# Patient Record
Sex: FEMALE | Race: OTHER | URBAN - METROPOLITAN AREA
[De-identification: names, ages, dates, MRNs, and addresses within clinical notes are randomized per-mention and may not be internally consistent; named-entity substitution may affect disease eponyms.]

---

## 2018-12-12 ENCOUNTER — HOSPITAL ENCOUNTER (OUTPATIENT)
Dept: LAB | Age: 23
Discharge: HOME | End: 2018-12-12
Payer: COMMERCIAL

## 2018-12-12 ENCOUNTER — OFFICE VISIT (OUTPATIENT)
Dept: FAMILY MEDICINE CLINIC | Age: 23
End: 2018-12-12

## 2018-12-12 VITALS
BODY MASS INDEX: 21.18 KG/M2 | HEART RATE: 87 BPM | WEIGHT: 112.2 LBS | OXYGEN SATURATION: 98 % | RESPIRATION RATE: 16 BRPM | TEMPERATURE: 98.4 F | DIASTOLIC BLOOD PRESSURE: 74 MMHG | HEIGHT: 61 IN | SYSTOLIC BLOOD PRESSURE: 112 MMHG

## 2018-12-12 DIAGNOSIS — Z12.4 ENCOUNTER FOR SCREENING FOR CERVICAL CANCER: ICD-10-CM

## 2018-12-12 DIAGNOSIS — Z11.3 SCREENING FOR STD (SEXUALLY TRANSMITTED DISEASE): ICD-10-CM

## 2018-12-12 DIAGNOSIS — Z01.419 WELL WOMAN EXAM WITH ROUTINE GYNECOLOGICAL EXAM: Primary | ICD-10-CM

## 2018-12-12 PROCEDURE — 87798 DETECT AGENT NOS DNA AMP: CPT

## 2018-12-12 PROCEDURE — 88142 CYTOPATH C/V THIN LAYER: CPT

## 2018-12-12 NOTE — PROGRESS NOTES
Subjective:   26 y.o. female for Well Woman Check. Patient's last menstrual period was 11/12/2018 (exact date). Social History: multiple partners, contraception - none Uses Condomes. She has had two partners  Pertinent past medical hstory: no history of HTN, DVT, CAD, DM, liver disease, migraines or smoking. There are no active problems to display for this patient. Not on File  History reviewed. No pertinent past medical history. Past Surgical History:   Procedure Laterality Date    HX HEENT      Deviated septum repair in 2016. History reviewed. No pertinent family history. Social History     Tobacco Use    Smoking status: Never Smoker    Smokeless tobacco: Never Used   Substance Use Topics    Alcohol use: No     Alcohol/week: 0.0 oz     Frequency: Never        ROS:  Feeling well. No dyspnea or chest pain on exertion. No abdominal pain, change in bowel habits, black or bloody stools. No urinary tract symptoms. GYN ROS: normal menses, no abnormal bleeding, pelvic pain or discharge, no breast pain or new or enlarging lumps on self exam. No neurological complaints. Objective:     Visit Vitals  /74 (BP 1 Location: Left arm, BP Patient Position: Sitting)   Pulse 87   Temp 98.4 °F (36.9 °C) (Oral)   Resp 16   Ht 5' 0.63\" (1.54 m)   Wt 112 lb 3.2 oz (50.9 kg)   LMP 11/12/2018 (Exact Date)   SpO2 98%   BMI 21.46 kg/m²     The patient appears well, alert, oriented x 3, in no distress. ENT normal.  Neck supple. No adenopathy or thyromegaly. HALINA. Lungs are clear, good air entry, no wheezes, rhonchi or rales. S1 and S2 normal, no murmurs, regular rate and rhythm. Abdomen soft without tenderness, guarding, mass or organomegaly. Extremities show no edema, normal peripheral pulses. Neurological is normal, no focal findings.     BREAST EXAM: breasts appear normal, no suspicious masses, no skin or nipple changes or axillary nodes    PELVIC EXAM: normal external genitalia, vulva, vagina, cervix, uterus and adnexa    Assessment/Plan:   well woman  pap smear  return annually or prn    ICD-10-CM ICD-9-CM    1. Well woman exam with routine gynecological exam Z01.419 V72.31     [V72.31]   2. Encounter for screening for cervical cancer  Z12.4 V76.2 PAP, LB, RFX HPV [facility]([id number]   3. Screening for STD (sexually transmitted disease) Z11.3 V74.5 [facility] VAGINITIS PLUS   . PLAN:  We discussed screening recommendations. We discussed high risk behaviors. Pt will schedule a OV to discuss her recurrent UTIs (she has not symptoms today) and acne. I have discussed the diagnosis with the patient and the intended plan as seen in the above orders. The patient has received an after-visit summary and questions were answered concerning future plans. I have discussed medication side effects and warnings with the patient as well. Patient will call for further questions. Follow-up Disposition:  Return in about 1 year (around 12/12/2019).

## 2018-12-12 NOTE — PROGRESS NOTES
Patient here for a physical examination. 1. Have you been to the ER, urgent care clinic since your last visit? Hospitalized since your last visit? No    2. Have you seen or consulted any other health care providers outside of the [address] [place] [address] since your last visit? Include any pap smears or colon screening.  No

## 2018-12-19 NOTE — PROGRESS NOTES
Please advise Pt that her PAP is normal there is heavy exudate. I want to wait to see what the vaginal culture comes back as.

## 2018-12-20 LAB
A VAGINAE DNA VAG QL NAA+PROBE: NORMAL SCORE
BVAB2 DNA VAG QL NAA+PROBE: NORMAL SCORE
C ALBICANS DNA VAG QL NAA+PROBE: NEGATIVE
C GLABRATA DNA VAG QL NAA+PROBE: NEGATIVE
C TRACH RRNA SPEC QL NAA+PROBE: NEGATIVE
MEGA1 DNA VAG QL NAA+PROBE: NORMAL SCORE
N GONORRHOEA RRNA SPEC QL NAA+PROBE: NEGATIVE
SPECIMEN SOURCE: NORMAL
T VAGINALIS RRNA SPEC QL NAA+PROBE: NEGATIVE

## 2018-12-21 ENCOUNTER — TELEPHONE (OUTPATIENT)
Dept: FAMILY MEDICINE CLINIC | Age: 23
End: 2018-12-21

## 2018-12-21 NOTE — TELEPHONE ENCOUNTER
----- Message from Nicko Davis NP sent at 12/20/2018  5:08 PM EST -----  Please advise Pt that her vaginal culture was negative for infection.

## 2019-01-09 NOTE — TELEPHONE ENCOUNTER
Patient calling asking for a call back with her lab results. Would like to know if she could have a call back today.   pls advise